# Patient Record
Sex: FEMALE | Race: WHITE | NOT HISPANIC OR LATINO | Employment: FULL TIME | URBAN - METROPOLITAN AREA
[De-identification: names, ages, dates, MRNs, and addresses within clinical notes are randomized per-mention and may not be internally consistent; named-entity substitution may affect disease eponyms.]

---

## 2018-11-08 ENCOUNTER — APPOINTMENT (OUTPATIENT)
Dept: ULTRASOUND IMAGING | Facility: CLINIC | Age: 63
End: 2018-11-08
Attending: EMERGENCY MEDICINE
Payer: COMMERCIAL

## 2018-11-08 ENCOUNTER — HOSPITAL ENCOUNTER (OUTPATIENT)
Facility: CLINIC | Age: 63
Setting detail: OBSERVATION
Discharge: HOME | End: 2018-11-08
Attending: EMERGENCY MEDICINE | Admitting: INTERNAL MEDICINE
Payer: COMMERCIAL

## 2018-11-08 VITALS
HEIGHT: 62 IN | TEMPERATURE: 98.1 F | SYSTOLIC BLOOD PRESSURE: 185 MMHG | BODY MASS INDEX: 41.97 KG/M2 | WEIGHT: 228.1 LBS | RESPIRATION RATE: 18 BRPM | DIASTOLIC BLOOD PRESSURE: 77 MMHG | OXYGEN SATURATION: 100 %

## 2018-11-08 DIAGNOSIS — K80.20 SYMPTOMATIC CHOLELITHIASIS: ICD-10-CM

## 2018-11-08 LAB
ALBUMIN SERPL-MCNC: 3.8 G/DL (ref 3.4–5)
ALP SERPL-CCNC: 88 U/L (ref 40–150)
ALT SERPL W P-5'-P-CCNC: 25 U/L (ref 0–50)
ANION GAP SERPL CALCULATED.3IONS-SCNC: 8 MMOL/L (ref 3–14)
AST SERPL W P-5'-P-CCNC: 10 U/L (ref 0–45)
BASOPHILS # BLD AUTO: 0.1 10E9/L (ref 0–0.2)
BASOPHILS NFR BLD AUTO: 0.5 %
BILIRUB SERPL-MCNC: 0.4 MG/DL (ref 0.2–1.3)
BUN SERPL-MCNC: 22 MG/DL (ref 7–30)
CALCIUM SERPL-MCNC: 8.7 MG/DL (ref 8.5–10.1)
CHLORIDE SERPL-SCNC: 105 MMOL/L (ref 94–109)
CO2 SERPL-SCNC: 26 MMOL/L (ref 20–32)
CREAT BLD-MCNC: 0.9 MG/DL (ref 0.52–1.04)
CREAT SERPL-MCNC: 0.89 MG/DL (ref 0.52–1.04)
DIFFERENTIAL METHOD BLD: ABNORMAL
EOSINOPHIL # BLD AUTO: 0.2 10E9/L (ref 0–0.7)
EOSINOPHIL NFR BLD AUTO: 2.2 %
ERYTHROCYTE [DISTWIDTH] IN BLOOD BY AUTOMATED COUNT: 12.8 % (ref 10–15)
GFR SERPL CREATININE-BSD FRML MDRD: 63 ML/MIN/1.7M2
GFR SERPL CREATININE-BSD FRML MDRD: 64 ML/MIN/1.7M2
GLUCOSE SERPL-MCNC: 221 MG/DL (ref 70–99)
HBA1C MFR BLD: 6.6 % (ref 0–5.6)
HCT VFR BLD AUTO: 41.3 % (ref 35–47)
HGB BLD-MCNC: 13.4 G/DL (ref 11.7–15.7)
IMM GRANULOCYTES # BLD: 0.1 10E9/L (ref 0–0.4)
IMM GRANULOCYTES NFR BLD: 0.5 %
LIPASE SERPL-CCNC: 234 U/L (ref 73–393)
LYMPHOCYTES # BLD AUTO: 2.7 10E9/L (ref 0.8–5.3)
LYMPHOCYTES NFR BLD AUTO: 24.4 %
MCH RBC QN AUTO: 29.3 PG (ref 26.5–33)
MCHC RBC AUTO-ENTMCNC: 32.4 G/DL (ref 31.5–36.5)
MCV RBC AUTO: 90 FL (ref 78–100)
MONOCYTES # BLD AUTO: 0.7 10E9/L (ref 0–1.3)
MONOCYTES NFR BLD AUTO: 6.6 %
NEUTROPHILS # BLD AUTO: 7.3 10E9/L (ref 1.6–8.3)
NEUTROPHILS NFR BLD AUTO: 65.8 %
NRBC # BLD AUTO: 0 10*3/UL
NRBC BLD AUTO-RTO: 0 /100
PLATELET # BLD AUTO: 211 10E9/L (ref 150–450)
POTASSIUM SERPL-SCNC: 3.6 MMOL/L (ref 3.4–5.3)
PROT SERPL-MCNC: 6.8 G/DL (ref 6.8–8.8)
RBC # BLD AUTO: 4.57 10E12/L (ref 3.8–5.2)
SODIUM SERPL-SCNC: 139 MMOL/L (ref 133–144)
WBC # BLD AUTO: 11.1 10E9/L (ref 4–11)

## 2018-11-08 PROCEDURE — G0378 HOSPITAL OBSERVATION PER HR: HCPCS

## 2018-11-08 PROCEDURE — 83036 HEMOGLOBIN GLYCOSYLATED A1C: CPT | Performed by: EMERGENCY MEDICINE

## 2018-11-08 PROCEDURE — 76705 ECHO EXAM OF ABDOMEN: CPT

## 2018-11-08 PROCEDURE — 99285 EMERGENCY DEPT VISIT HI MDM: CPT | Mod: 25

## 2018-11-08 PROCEDURE — 25000128 H RX IP 250 OP 636: Performed by: INTERNAL MEDICINE

## 2018-11-08 PROCEDURE — 96374 THER/PROPH/DIAG INJ IV PUSH: CPT

## 2018-11-08 PROCEDURE — 99207 ZZC CDG-CODE CATEGORY CHANGED: CPT | Performed by: INTERNAL MEDICINE

## 2018-11-08 PROCEDURE — 82565 ASSAY OF CREATININE: CPT

## 2018-11-08 PROCEDURE — 96375 TX/PRO/DX INJ NEW DRUG ADDON: CPT

## 2018-11-08 PROCEDURE — 99235 HOSP IP/OBS SAME DATE MOD 70: CPT | Performed by: INTERNAL MEDICINE

## 2018-11-08 PROCEDURE — 25000128 H RX IP 250 OP 636: Performed by: EMERGENCY MEDICINE

## 2018-11-08 PROCEDURE — 85025 COMPLETE CBC W/AUTO DIFF WBC: CPT | Performed by: EMERGENCY MEDICINE

## 2018-11-08 PROCEDURE — 83690 ASSAY OF LIPASE: CPT | Performed by: EMERGENCY MEDICINE

## 2018-11-08 PROCEDURE — 80053 COMPREHEN METABOLIC PANEL: CPT | Performed by: EMERGENCY MEDICINE

## 2018-11-08 PROCEDURE — 99207 ZZC APP CREDIT; MD BILLING SHARED VISIT: CPT | Performed by: INTERNAL MEDICINE

## 2018-11-08 PROCEDURE — 96361 HYDRATE IV INFUSION ADD-ON: CPT

## 2018-11-08 PROCEDURE — 99203 OFFICE O/P NEW LOW 30 MIN: CPT | Performed by: SURGERY

## 2018-11-08 RX ORDER — HYDROMORPHONE HYDROCHLORIDE 1 MG/ML
INJECTION, SOLUTION INTRAMUSCULAR; INTRAVENOUS; SUBCUTANEOUS
Status: DISCONTINUED
Start: 2018-11-08 | End: 2018-11-08 | Stop reason: HOSPADM

## 2018-11-08 RX ORDER — ACETAMINOPHEN 650 MG/1
650 SUPPOSITORY RECTAL EVERY 4 HOURS PRN
Status: DISCONTINUED | OUTPATIENT
Start: 2018-11-08 | End: 2018-11-08 | Stop reason: HOSPADM

## 2018-11-08 RX ORDER — ACETAMINOPHEN 325 MG/1
650 TABLET ORAL EVERY 4 HOURS PRN
Status: DISCONTINUED | OUTPATIENT
Start: 2018-11-08 | End: 2018-11-08 | Stop reason: HOSPADM

## 2018-11-08 RX ORDER — HYDROMORPHONE HYDROCHLORIDE 1 MG/ML
0.3 INJECTION, SOLUTION INTRAMUSCULAR; INTRAVENOUS; SUBCUTANEOUS
Status: DISCONTINUED | OUTPATIENT
Start: 2018-11-08 | End: 2018-11-08 | Stop reason: HOSPADM

## 2018-11-08 RX ORDER — KETOROLAC TROMETHAMINE 15 MG/ML
15 INJECTION, SOLUTION INTRAMUSCULAR; INTRAVENOUS ONCE
Status: COMPLETED | OUTPATIENT
Start: 2018-11-08 | End: 2018-11-08

## 2018-11-08 RX ORDER — ONDANSETRON 2 MG/ML
INJECTION INTRAMUSCULAR; INTRAVENOUS
Status: DISCONTINUED
Start: 2018-11-08 | End: 2018-11-08 | Stop reason: HOSPADM

## 2018-11-08 RX ORDER — ONDANSETRON 2 MG/ML
4 INJECTION INTRAMUSCULAR; INTRAVENOUS EVERY 6 HOURS PRN
Status: DISCONTINUED | OUTPATIENT
Start: 2018-11-08 | End: 2018-11-08 | Stop reason: HOSPADM

## 2018-11-08 RX ORDER — HYDROMORPHONE HYDROCHLORIDE 1 MG/ML
0.5 INJECTION, SOLUTION INTRAMUSCULAR; INTRAVENOUS; SUBCUTANEOUS
Status: DISCONTINUED | OUTPATIENT
Start: 2018-11-08 | End: 2018-11-08

## 2018-11-08 RX ORDER — ONDANSETRON 2 MG/ML
4 INJECTION INTRAMUSCULAR; INTRAVENOUS ONCE
Status: COMPLETED | OUTPATIENT
Start: 2018-11-08 | End: 2018-11-08

## 2018-11-08 RX ORDER — THYROID 30 MG/1
30 TABLET ORAL DAILY
Status: DISCONTINUED | OUTPATIENT
Start: 2018-11-08 | End: 2018-11-08 | Stop reason: CLARIF

## 2018-11-08 RX ORDER — ONDANSETRON 4 MG/1
4 TABLET, ORALLY DISINTEGRATING ORAL EVERY 6 HOURS PRN
Status: DISCONTINUED | OUTPATIENT
Start: 2018-11-08 | End: 2018-11-08 | Stop reason: HOSPADM

## 2018-11-08 RX ORDER — HYDROMORPHONE HYDROCHLORIDE 1 MG/ML
0.5 INJECTION, SOLUTION INTRAMUSCULAR; INTRAVENOUS; SUBCUTANEOUS ONCE
Status: DISCONTINUED | OUTPATIENT
Start: 2018-11-08 | End: 2018-11-08

## 2018-11-08 RX ORDER — NALOXONE HYDROCHLORIDE 0.4 MG/ML
.1-.4 INJECTION, SOLUTION INTRAMUSCULAR; INTRAVENOUS; SUBCUTANEOUS
Status: DISCONTINUED | OUTPATIENT
Start: 2018-11-08 | End: 2018-11-08 | Stop reason: HOSPADM

## 2018-11-08 RX ORDER — SODIUM CHLORIDE AND POTASSIUM CHLORIDE 150; 900 MG/100ML; MG/100ML
INJECTION, SOLUTION INTRAVENOUS CONTINUOUS
Status: DISCONTINUED | OUTPATIENT
Start: 2018-11-08 | End: 2018-11-08 | Stop reason: HOSPADM

## 2018-11-08 RX ADMIN — SODIUM CHLORIDE, POTASSIUM CHLORIDE, SODIUM LACTATE AND CALCIUM CHLORIDE 500 ML: 600; 310; 30; 20 INJECTION, SOLUTION INTRAVENOUS at 02:55

## 2018-11-08 RX ADMIN — HYDROMORPHONE HYDROCHLORIDE 0.5 MG: 1 INJECTION, SOLUTION INTRAMUSCULAR; INTRAVENOUS; SUBCUTANEOUS at 02:55

## 2018-11-08 RX ADMIN — ONDANSETRON 4 MG: 2 INJECTION INTRAMUSCULAR; INTRAVENOUS at 02:55

## 2018-11-08 RX ADMIN — POTASSIUM CHLORIDE AND SODIUM CHLORIDE: 900; 150 INJECTION, SOLUTION INTRAVENOUS at 06:26

## 2018-11-08 RX ADMIN — KETOROLAC TROMETHAMINE 15 MG: 15 INJECTION, SOLUTION INTRAMUSCULAR; INTRAVENOUS at 02:56

## 2018-11-08 ASSESSMENT — ENCOUNTER SYMPTOMS
ABDOMINAL PAIN: 1
VOMITING: 0
NAUSEA: 0
FEVER: 0

## 2018-11-08 NOTE — IP AVS SNAPSHOT
MRN:[id number]                      After Visit Summary   11/8/2018    Navya Haynes    MRN: [id number]           Thank you!     Thank you for choosing [facility] for your care. Our goal is always to provide you with excellent care. Hearing back from our patients is one way we can continue to improve our services. Please take a few minutes to complete the written survey that you may receive in the mail after you visit. If you would like to speak to someone directly about your visit please contact Patient Relations at [phone]. Thank you!          Patient Information     Date Of Birth          1955        About your hospital stay     You were admitted on:  November 8, 2018 You last received care in the:  [place] [address] [facility]    You were discharged on:  November 8, 2018        Reason for your hospital stay       Symptomatic cholelithiasis                  Who to Call     For medical emergencies, please call 911.  For non-urgent questions about your medical care, please call your primary care provider or clinic, [phone]          Attending Provider     Provider Specialty    Joey Izquierdo MD Emergency Medicine    [facility]       Primary Care Provider Office Phone # Fax #    Hansel Bryan [phone]      After Care Instructions     Activity       Your activity upon discharge: activity as tolerated            Diet       Follow this diet upon discharge: Low fat diet                  Follow-up Appointments     Follow-up and recommended labs and tests        Follow up with primary care provider, Hansel Bryan, within 7 days   Follow up with surgery as scheduled                  Pending Results     No orders found from 11/6/2018 to 11/9/2018.            Statement of Approval     Ordered          11/08/18 1605  I have reviewed and agree with all the recommendations and orders detailed  "in this document.  EFFECTIVE NOW     Approved and electronically signed by:  Asif Pineda MD             Admission Information     Date & Time Provider Department Dept. Phone    11/8/2018 Nichelle Crum MD [place] [address] [facility] [phone]      Your Vitals Were     Blood Pressure Temperature Respirations Height Weight Last Period    143/68 (BP Location: Right arm) 97  F (36.1  C) (Oral) 18 1.575 m (5' 2\") 103.5 kg (228 lb 1.6 oz) 10/01/2009    Pulse Oximetry BMI (Body Mass Index)                98% 41.72 kg/m2          MyChart Information     [employer] gives you secure access to your electronic health record. If you see a primary care provider, you can also send messages to your care team and make appointments. If you have questions, please call your primary care clinic.  If you do not have a primary care provider, please call [phone] and they will assist you.        Care EveryWhere ID     This is your Care EveryWhere ID. This could be used by other organizations to access your [facility] medical records  [id number]        Equal Access to Services     PRINCESS HARRISON : Hadii hannah Maria, wasophia rojas, qaakosua quispe, stephanie daniels. So [facility] [phone].    ATENCIÓN: Si habla español, tiene a tafoya disposición servicios gratuitos de asistencia lingüística. Llame al [phone].    We comply with applicable federal civil rights laws and [place] laws. We do not discriminate on the basis of race, color, national origin, age, disability, sex, sexual orientation, or gender identity.               Review of your medicines      Notice     You have not been prescribed any medications.             Protect others around you: Learn how to safely use, store and throw away your medicines at www.disposemymeds.org.             Medication List: This is a list of all your medications and when to take them. Check marks below indicate your daily home " schedule. Keep this list as a reference.      Notice     You have not been prescribed any medications.

## 2018-11-08 NOTE — PLAN OF CARE
"Problem: Patient Care Overview  Goal: Plan of Care/Patient Progress Review  PRIMARY DIAGNOSIS: \"GENERIC\" NURSING  OUTPATIENT/OBSERVATION GOALS TO BE MET BEFORE DISCHARGE:  1. ADLs back to baseline: Yes    2. Activity and level of assistance: Ambulating independently.    3. Pain status: Pain free.    4. Return to near baseline physical activity: Yes     Discharge Planner Nurse   Safe discharge environment identified: Yes  Barriers to discharge: Yes - tolerance to low fat diet       Entered by: Areli Selby 11/08/2018 3:29 PM     Please review provider order for any additional goals.   Nurse to notify provider when observation goals have been met and patient is ready for discharge.    Pt admitted for RUQ pain and nausea related to cholelithiasis  No interventions required throughout the course of the day  Denies pain and nausea - NPO with ice and meds  Advanced to low fat diet at 1430 - awaiting to see tolerance  RUQ tenderness with palpation, up independently in room   Surgery consulted        "

## 2018-11-08 NOTE — DISCHARGE SUMMARY
[facility]    Discharge Summary  Hospitalist    Date of Admission:  11/8/2018  Date of Discharge:  11/8/2018  Discharging Provider: Asif Pineda MD  Date of Service (when I saw the patient): 11/08/18    Discharge Diagnoses      1. Acute abdominal pain right upper quadrant sudden onset associated with diaphoresis.     2. Hypothyroidism     History of Present Illness   Navya Haynes is an 63 year old female who presented with RUQ pain.     Hospital Course   Navya Haynes is a 63 year old female who with past medical history significant for hypothyroidism presented to the emergency room with upper abdominal and right upper quadrant pain.  Right upper quadrant ultrasound showed mobile biliary stone no clear evidence of cholecystitis.  Patient is being admitted for pain control for Cholelithiasis.     Acute abdominal pain right upper quadrant sudden onset associated with diaphoresis.  --Gallstones noted on ultrasound with minimal gallbladder wall thickening. She had no fever no chills. Pain resolved with IV Dilaudid in the emergency room. she was initially kept N.p.o. she was evaluated by general surgery. Patient wanted to avoid surgery if possible. Surgery saw her and recommended outpatient follow up.      Hypothyroidism  --Continued on prior to admission thyroid replacement.she was advised to follow up with PCP.      Patient remained stable during hospital course. She was discharged home with a plan to follow up with surgery as outpatient.     Significant Results and Procedures   Results for orders placed or performed during the hospital encounter of 11/08/18   US Abdomen Limited    Narrative    ULTRASOUND ABDOMEN LIMITED RIGHT UPPER QUADRANT  11/8/2018 3:33 AM     HISTORY: Right upper quadrant pain.    COMPARISON: None.    FINDINGS: Mild diffuse increased hepatic echogenicity, likely  representing fatty infiltration. No hepatic masses. 1 cm nonmobile  gallstone in the neck of a borderline  distended gallbladder. The  gallbladder is otherwise unremarkable without gallstones, wall  thickening or pericholecystic fluid. No focal tenderness over the  gallbladder. No intra- or extrahepatic biliary dilatation. The common  duct is at the upper limits of normal in caliber, measuring 0.6 cm in  diameter. The right kidney has normal size and echogenicity, measuring  10.2 cm in length. No right intrarenal collecting system dilatation,  calculi or masses. No free fluid in the upper right hemiabdomen.      Impression    IMPRESSION:  1. 1 cm nonmobile gallstone in the neck of a borderline distended  gallbladder. The gallbladder is otherwise unremarkable in appearance.  If there is a clinical concern for acute cholecystitis, a HIDA scan  could be considered for further evaluation.  2. No biliary dilatation.  3. Mild diffuse fatty infiltration of the liver.    ADÁN CHAUDHRY MD         Pending Results   None  Code Status   Full Code       Primary Care Physician   Hansel Bryan        Discharge Disposition   None    Consultations This Hospital Stay   SURGERY GENERAL IP CONSULT    Time Spent on this Encounter   I, Asif Pineda MD, personally saw the patient today and spent greater than 30 minutes discharging this patient.    Discharge Orders     Reason for your hospital stay   Symptomatic cholelithiasis     Follow-up and recommended labs and tests    Follow up with primary care provider, Hansel Bryan, within 7 days   Follow up with surgery as scheduled     Activity   Your activity upon discharge: activity as tolerated     Diet   Follow this diet upon discharge: Low fat diet       Discharge Medications   There are no discharge medications for this patient.        Allergies   Allergies   Allergen Reactions     No Known Allergies      Data   Most Recent 3 CBC's:  Recent Labs   Lab Test  11/08/18   0254   WBC  11.1*   HGB  13.4   MCV  90   PLT  211      Most Recent 3 BMP's:  Recent Labs    Lab Test  11/08/18 0254   NA  139   POTASSIUM  3.6   CHLORIDE  105   CO2  26   BUN  22   CR  0.89   ANIONGAP  8   RAQUEL  8.7   GLC  221*     Most Recent 2 LFT's:  Recent Labs   Lab Test  11/08/18 0254   AST  10   ALT  25   ALKPHOS  88   BILITOTAL  0.4     Most Recent INR's and Anticoagulation Dosing History:  Anticoagulation Dose History     There is no flowsheet data to display.        Most Recent 3 Troponin's:No lab results found.  Most Recent Cholesterol Panel:No lab results found.  Most Recent 6 Bacteria Isolates From Any Culture (See EPIC Reports for Culture Details):No lab results found.  Most Recent TSH, T4 and A1c Labs:  Recent Labs   Lab Test  11/08/18 0254   A1C  6.6*

## 2018-11-08 NOTE — CONSULTS
Chief complaint:  Abdominal pain, right upper quadrant    HPI:  This patient is a 63 year old female who presents with right upper quadrant abdominal pain which began at about midnight last night.  The patient came into the emergency room at about 2 AM, and was given a dose of pain medication.  The patient states that her pain then resolved.  She denied fevers, but did have some subjective chills.  She denied nausea or vomiting.  The pain did radiate to her back.  She ate fried chicken for lunch and tied close for dinner prior to this attack.    Past Medical History:   has a past medical history of Contact dermatitis and other eczema, due to unspecified cause and Obesity, unspecified.    Past Surgical History:  Past Surgical History:   Procedure Laterality Date     NO HISTORY OF SURGERY          Social History:  Social History     Social History     Marital status:      Spouse name: N/A     Number of children: 3     Years of education: N/A     Occupational History     Not on file.     Social History Main Topics     Smoking status: Former Smoker     Smokeless tobacco: Never Used      Comment: quit 1977     Alcohol use 0.0 oz/week     1 Standard drinks or equivalent per week      Comment: rare     Drug use: No     Sexual activity: Yes     Partners: Male     Birth control/ protection: Surgical     Other Topics Concern     Not on file     Social History Narrative        Family History:  Family History   Problem Relation Age of Onset     Hypertension Mother      Hypertension Father      Hypertension Maternal Grandmother      Hypertension Paternal Grandmother      Diabetes Paternal Grandfather      Elderly     Lipids Mother      Lipids Father      Cardiovascular Mother      Pacer, possible other issues     Cancer Paternal Grandmother      Unknown primary     Cardiovascular Mother      Pacemaker      Cancer Mother      liver cancer, unknown primary       Review of Systems:  The 10 point Review of Systems is negative  other than noted in the HPI and above.    Physical Exam:  General - This is a well developed, well nourished female in no apparent distress.  HEENT - Normocephalic, atraumatic.  No scleral icterus.  Neck - supple without masses  Lungs - clear to ascultation.    Heart - Regular rate & rhythm without murmur  Abdomen:   soft, non-distended and nontender.  normal bowel sounds.  Extremities - warm without edema  Neurologic - nonfocal    Relevant labs:  Normal liver function tests.  Lipase 234.  White blood cell count 11,100.    Imaging:  Ultrasound reveals a 1 cm nonmobile gallstone in the neck of a borderline distended gallbladder.  No biliary dilatation.    Assessment and Plan:  This is a patient with a prominent gallstone and an attack of right upper quadrant pain.  Explained that this likely represents biliary colic, and that we would typically recommend surgery at some point.  The patient is interested in trying to avoid surgery if possible.  We did discuss laparoscopic cholecystectomy, along with its risks and complications, in detail.  I think it would be reasonable to start the patient on a low-fat diet.  She could then be discharged to home.  She is welcome to follow up with me in the office, or she may simply return if she has another attack.  I expanded I thought was very likely that she would indeed have more attacks in the future.  She expressed understanding.      Kiko Carvalho MD  [facility]

## 2018-11-08 NOTE — PROGRESS NOTES
PRIMARY DIAGNOSIS: ACUTE PAIN  OUTPATIENT/OBSERVATION GOALS TO BE MET BEFORE DISCHARGE:  1. Pain Status: Improved but still requiring IV narcotics.    2. Return to near baseline physical activity: No    3. Cleared for discharge by consultants (if involved): No    Discharge Planner Nurse   Safe discharge environment identified: Yes  Barriers to discharge: Yes       Entered by: Padmini Benitez 11/08/2018 7:07 AM   Pt BP elevated, anxious about hospitalization. Denies pain. Denies N/V. BS active x4, passing gas, LBM 11/8. NPO. Up independently. IVF @ 100. Consults: Surgery.    Please review provider order for any additional goals.   Nurse to notify provider when observation goals have been met and patient is ready for discharge.

## 2018-11-08 NOTE — ED NOTES
Pt given obs video.  Pt states pain is still ok, denies needs for pain medications at this time.   at bedside.

## 2018-11-08 NOTE — Clinical Note
Admitting Physician: JAROD TALLEY [[id number]]   [id number]   Bed Type: Observation Unit [54]   Special needs: Fall Risk [8]   Bed request comments: OBS ADMIT, 1 PERSON TF, BED REQUEST @ 7505

## 2018-11-08 NOTE — PROGRESS NOTES
Patient's After Visit Summary was reviewed with patient and .   Patient verbalized understanding of After Visit Summary, recommended follow up and was given an opportunity to ask questions.   Discharge medications sent home with patient/family: Not applicable.   Discharged with .  Offered wheeled ride to car, patient declined.  Patient discharged with belongings.

## 2018-11-08 NOTE — PHARMACY-ADMISSION MEDICATION HISTORY
Admission medication history interview status for this patient is complete. See [facility] admission navigator for allergy information, prior to admission medications and immunization status.     Medication history interview source(s):Patient  Medication history resources (including written lists, pill bottles, clinic record):None  Primary pharmacy: [facility] in [place]    Changes made to PTA medication list:  Added:  None  Deleted:  Co Q10, Iron, Kelp, MVI, Selenium & Harper Thyroid.  Changed:  None    Actions taken by pharmacist (provider contacted, etc):None     Additional medication history information: Pt does not take any meds or supplements currently, she stopped taking all few months ago.    Medication reconciliation/reorder completed by provider prior to medication history? Yes    Do you take OTC medications (eg tylenol, ibuprofen, fish oil, eye/ear drops, etc)?  No    For patients on insulin therapy:  No      Prior to Admission medications    Not on File

## 2018-11-08 NOTE — PLAN OF CARE
Problem: Patient Care Overview  Goal: Plan of Care/Patient Progress Review  Outcome: Improving  PRIMARY DIAGNOSIS: ACUTE RENAL COLIC    OUTPATIENT/OBSERVATION GOALS TO BE MET BEFORE DISCHARGE  1. Pain Status: Denies pain    2. Tolerating adequate PO diet: No, NPO    3. Surgical Intervention planned: Surgery consult ordered/pending    4. Cleared by consultants (if involved): No    5. Return to near baseline physical activity: Yes    Discharge Planner Nurse   Safe discharge environment identified:Yes  Barriers to discharge: No    A&O, VSS, denies pain/nausea.  Up independently. Positive bowel sounds/ BM this am prior to admission.  IV fluids infusing as ordered.  Awaiting surgical consult.  Will continue to monitor.          Please review provider order for any additional goals.   Nurse to notify provider when observation goals have been met and patient is ready for discharge.

## 2018-11-08 NOTE — PROGRESS NOTES
MD paged: , took first , on re-check it was elevated.  Is it still okay to discharge patient? Please call, thank you!    Addendum:  MD called to state patient was okay to discharge.  Patient and  educated on monitoring blood pressure at home, it was stated a blood pressure cuff was available at home to monitor going forward.  Otherwise VSS.

## 2018-11-08 NOTE — ED TRIAGE NOTES
Pt with right upper abdominal pain that woke her up approx midnight.  Denies N/V/D.  ABCs intact, alert and orientated.

## 2018-11-08 NOTE — IP AVS SNAPSHOT
[place] [address] [facility]    [address]    [facility] [address]    Phone:  [phone]    Fax:  [phone]                                       After Visit Summary   11/8/2018    Navya Haynes    MRN: [id number]           After Visit Summary Signature Page     I have received my discharge instructions, and my questions have been answered. I have discussed any challenges I see with this plan with the nurse or doctor.    ..........................................................................................................................................  Patient/Patient Representative Signature      ..........................................................................................................................................  Patient Representative Print Name and Relationship to Patient    ..................................................               ................................................  Date                                   Time    ..........................................................................................................................................  Reviewed by Signature/Title    ...................................................              ..............................................  Date                                               Time          22EPIC Rev 08/18

## 2018-11-08 NOTE — ED NOTES
[facility]  ED Nurse Handoff Report    Navya Haynes is a 63 year old female   ED Chief complaint: Abdominal Pain  . ED Diagnosis:   Final diagnoses:   Symptomatic cholelithiasis     Allergies:   Allergies   Allergen Reactions     No Known Allergies        Code Status: not on file  Activity level - Baseline/Home:  Independent. Activity Level - Current:   Stand with Assist. Lift room needed: No. Bariatric: No   Needed: No   Isolation: No. Infection: Not Applicable.     Vital Signs:   Vitals:    11/08/18 0330 11/08/18 0345 11/08/18 0400 11/08/18 0415   BP: 147/65 142/71 140/63 127/68   Resp:       Temp:       TempSrc:       SpO2: 98% 95% 92% 91%   Weight:           Cardiac Rhythm:  ,      Pain level: 0-10 Pain Scale: 1  Patient confused: No. Patient Falls Risk: Yes.   Elimination Status: has not yet voided   Patient Report - Initial Complaint: Abdominal Pain. Focused Assessment: Pt woke up approx midnight tonight with sudden onset right upper quadrant pain directly below ribs.  Denied any nausea, vomiting, or diarrhea.  Pain also radiated into lower back.  Pain subsided with 1 dose of dilaudid & toradol, pt sleepy after.   Tests Performed:   Labs Ordered and Resulted from Time of ED Arrival Up to the Time of Departure from the ED   CBC WITH PLATELETS DIFFERENTIAL - Abnormal; Notable for the following:        Result Value    WBC 11.1 (*)     All other components within normal limits   COMPREHENSIVE METABOLIC PANEL - Abnormal; Notable for the following:     Glucose 221 (*)     All other components within normal limits   LIPASE   CREATININE POCT   ISTAT CREATININE NURSING POCT     US Abdomen Limited   Final Result   IMPRESSION:   1. 1 cm nonmobile gallstone in the neck of a borderline distended   gallbladder. The gallbladder is otherwise unremarkable in appearance.   If there is a clinical concern for acute cholecystitis, a HIDA scan   could be considered for further evaluation.   2. No biliary  dilatation.   3. Mild diffuse fatty infiltration of the liver.      ADÁN CHAUDHRY MD        Treatments provided: See MAR  Family Comments:  at bedside  OBS brochure/video discussed/provided to patient:  Yes  ED Medications:   Medications   HYDROmorphone (PF) (DILAUDID) injection 0.5 mg (not administered)   HYDROmorphone (PF) (DILAUDID) injection 0.5 mg (0.5 mg Intravenous Given 11/8/18 0255)   ondansetron (ZOFRAN) 2 MG/ML injection (not administered)   ketorolac (TORADOL) injection 15 mg (15 mg Intravenous Given 11/8/18 0256)   ondansetron (ZOFRAN) injection 4 mg (4 mg Intravenous Given 11/8/18 0255)   lactated ringers BOLUS 500 mL (0 mLs Intravenous Stopped 11/8/18 0331)     Drips infusing:  No  For the majority of the shift, the patient's behavior Green. Interventions performed were rounding.     Severe Sepsis OR Septic Shock Diagnosis Present: No      ED Nurse Name/Phone Number: Neris Shin,   4:32 AM  RECEIVING UNIT ED HANDOFF REVIEW    Above ED Nurse Handoff Report was reviewed: Yes  Reviewed by: Padmini Benitez on November 8, 2018 at 5:51 AM

## 2018-11-08 NOTE — ED PROVIDER NOTES
History     Chief Complaint:  Abdominal Pain    HPI   Navya Haynes is a 63 year old female who presents with right upper quadrant abdominal pain. The patient presents with her  and reports that the family ate dinner at around 4718-4187 tonight. Everyone had about the same meal. The patient later went to bed but woke up with this sharp acute right flank / right upper quadrant pain which radiates into her mid back as well. She denies any pain radiating down into groin.  No nausea vomiting diarrhea, No fever. No abdominal surgery.     Allergies:  No known drug allergies.     Medications:    None    Past Medical History:    Contact dermatitis  Obesity  Hypothyroidism       Past Surgical History:    History reviewed. No pertinent past surgical history.     Family History:    hypertension  Lipids   Cardiovascular   Cancer    Social History:  The patient  reports that she has quit smoking. She has never used smokeless tobacco. She reports that she drinks alcohol. She reports that she does not use illicit drugs.   Marital Status:   [2]     Review of Systems   Constitutional: Negative for fever.   Gastrointestinal: Positive for abdominal pain. Negative for nausea and vomiting.   All other systems reviewed and are negative.    Physical Exam   First Vitals:  BP: 167/67  Heart Rate: 68  Temp: 97.8  F (36.6  C)  Resp: 18  Weight: 102.1 kg (225 lb)  SpO2: 100 %      Physical Exam   Constitutional: She appears distressed (Uncomfortable appearing secondary to pain).   HENT:   Right Ear: External ear normal.   Left Ear: External ear normal.   Nose: Nose normal.   Eyes: Conjunctivae and lids are normal.   Neck: Neck supple. No tracheal deviation present.   Cardiovascular: Regular rhythm and intact distal pulses.    Pulmonary/Chest: Breath sounds normal. No respiratory distress.   Abdominal: Soft. She exhibits no distension. There is tenderness (Tenderness in the epigastrium and right upper quadrant negative Contreras  sign no tenderness at McBurney's point). There is no rebound and no guarding.   Musculoskeletal:   No peripheral edema   Neurological:   MAEE, no gross focal motor or sensory deficit   Skin: Skin is warm and dry. She is not diaphoretic.   Psychiatric: She has a normal mood and affect.   Nursing note and vitals reviewed.      Emergency Department Course   Imaging:   US Abdomen Limited   Final Result   IMPRESSION:   1. 1 cm nonmobile gallstone in the neck of a borderline distended   gallbladder. The gallbladder is otherwise unremarkable in appearance.   If there is a clinical concern for acute cholecystitis, a HIDA scan   could be considered for further evaluation.   2. No biliary dilatation.   3. Mild diffuse fatty infiltration of the liver.      ADÁN CHAUDHRY MD          I communicated the results of the imaging studies with the patient who expressed understanding of these findings.      Laboratory:  Creatinine POCT: WNL  CBC: WBC 11.1, otherwise within normal limits   CMP: Glucose 221, otherwise within normal limits   Lipase 234    Interventions:  0255: Dilaudid 0.5mg  0255: Lactated ringers bolus 500 mL given  0255: Toradol 15mg  0255: Zofran 4mg     Emergency Department Course:  Past medical records, nursing notes, and vitals reviewed.   I performed an exam of the patient and obtained history, as documented above.       IV inserted and blood drawn for the above work up to be conducted.      Discussed the patient with Dr. Marx, who will admit the patient to an observation bed for further monitoring, evaluation, and treatment.      Findings and plan explained to the Patient who consents to admission.   Impression & Plan    Medical Decision Making:  RUQ abdominal pain which is concerning for cholecystitis/cholelithiasis/biliary colic v choledocholithiasis v SBO v enterocolitis v PUD v acute hepatitis v cholangitis v ureterolithasis v pyelonephritis. I think it is less likely aaa v aortic dissection v acute  mesenteric ischemia v atypical appendicitis, or primary cardiopulmonary disorder.    Pt improved with above interventions, workup showing likely symptomatic cholelithiasis.  Very mild leukocytosis no secondary findings on ultrasound of cholecystitis.  On repeat examination she is much more comfortable but still has pain in the right upper quadrant with inspiration.  No evidence of choledocholithiasis by blood tests or ultrasound.  Recommended observation admission surgical consultation.      Diagnosis:    ICD-10-CM    1. Symptomatic cholelithiasis K80.20        Disposition:  Admitted to observation   Gino TOPETE, am serving as a scribe on 11/8/2018 to document services personally performed by Joey Izquierdo MD* based on my observations and the provider's statements to me.    [facility] [place] EMERGENCY DEPARTMENT       Joey Izquierdo MD  11/08/18 0653

## 2018-11-08 NOTE — H&P
[facility]    Hospitalist History and Physical    Name: Navya Haynes    MRN: [id number]  YOB: 1955    Age: 63 year old  Date of Admission:  11/8/2018  Date of Service (when I saw the patient): 11/08/18    Assessment & Plan   Navya Haynes is a 63 year old female who with past medical history significant for hypothyroidism presented to the emergency room with upper abdominal and right upper quadrant pain.  Right upper quadrant ultrasound showed mobile biliary stone no clear evidence of cholecystitis.  Patient is being admitted for pain control for Cholelithiasis.    Acute abdominal pain right upper quadrant sudden onset associated with diaphoresis.  --Gallstones noted on ultrasound with minimal gallbladder wall thickening  --No fever no chills.  --Pain resolved with IV Dilaudid in the emergency room  --Admit for pain control PRN IV Dilaudid  --N.p.o. for now until patient is evaluated by general surgery.  Patient would like to avoid surgery if possible.  We will continue n.p.o. until evaluated by general surgery  --PRN pain meds  --IV fluids  --Admit for observation    Hypothyroidism  --Continue prior to admission thyroid replacement.  --We will need to adjust dose and do med recon once available    DVT Prophylaxis: Anti-embolisim stockings (TEDs)  Code Status: Full Code    Disposition: Expected discharge in 1-2 days once pain is controlled and evaluated by surgery    Primary Care Physician   Hansel Bryan    Chief Complaint   Upper abdominal pain started at midnight    History is obtained from the patient    History of Present Illness   Navya Haynes is a 63 year old female with past medical history significant for hypothyroidism comes to the emergency room with sudden onset severe abdominal pain.  She woke up at midnight severe sudden onset pain.  Pain described as sharp cramp-like 10 out of 10 in intensity mainly in upper abdomen then localizing in right upper quadrant.   Radiating to her back.  No nausea no vomiting no fever.  She felt cold had chills and was diaphoretic.  Patient has never had similar pain in past.  Did not eat since the pain started.  No relieving factors no exacerbating factors.  Patient could not find any position to make her comfortable.  She came to the emergency room.  She does give history of eating a greasy fried chicken for lunch yesterday and tacos at night.  Evaluation in the emergency room showed gallstones.  No evidence of Aimee cystitis.  Patient is being admitted for further evaluation and pain management.  Review of all the other systems negative    Past Medical History    Past Medical History:   Diagnosis Date     Contact dermatitis and other eczema, due to unspecified cause      Obesity, unspecified          Past Surgical History   Past Surgical History:   Procedure Laterality Date     NO HISTORY OF SURGERY         Prior to Admission Medications   Prior to Admission Medications   Prescriptions Last Dose Informant Patient Reported? Taking?   ARMOUR THYROID 30 MG PO TABS Unknown at Unknown time  No No   Si TABLET TWICE DAILY (follow up thyroid labs due)   COQ10 50 MG PO CAPS   Yes No   Si CAPSULE DAILY   IRON 28 MG OR TABS   Yes No   Si CAPSULE DAILY   KELP OR TABS   Yes No   Si TABLET DAILY   ONE-A-DAY ESSENTIAL OR TABS   Yes No   Si TABLET DAILY  ENZYMATIC THERAPY (BP manager)   SELENIUM 200 MCG OR CAPS   Yes No   Si CAPSULE DAILY      Facility-Administered Medications: None     Allergies   Allergies   Allergen Reactions     No Known Allergies        Social History   Social History   Substance Use Topics     Smoking status: Former Smoker     Smokeless tobacco: Never Used      Comment: quit      Alcohol use 0.0 oz/week     1 Standard drinks or equivalent per week      Comment: rare     Social History     Social History Narrative     Lives with family.  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  Denies use of smoking.   Social drinking rarely    Family History   Mother had liver liver cancer.  Not quite sure if it was primary or secondary  Grandparents had heart disease  Father  of alcohol-related pancreatitis    Review of Systems   A Comprehensive greater than 10 system review of systems was carried out.  Pertinent positives and negatives are noted above.  Otherwise negative for contributory information.    Physical Exam   Temp: 97.8  F (36.6  C) Temp src: Oral BP: 137/64   Heart Rate: 68 Resp: 18 SpO2: 93 % O2 Device: None (Room air)    Vital Signs with Ranges  Temp:  [97.8  F (36.6  C)] 97.8  F (36.6  C)  Heart Rate:  [68] 68  Resp:  [18] 18  BP: (127-171)/(63-79) 137/64  SpO2:  [91 %-100 %] 93 %  225 lbs 0 oz    GEN:  Alert, oriented x 3, appears comfortable, no overt distress  HEENT:  Normocephalic/atraumatic, no scleral icterus, no nasal discharge, mouth dry  CV:  Regular rate and rhythm, no murmur or JVD.  S1 + S2 noted, no S3 or S4.  LUNGS:  Clear to auscultation bilaterally without rales/rhonchi/wheezing/retractions.  Symmetric chest rise on inhalation noted.  ABD:  Active bowel sounds, soft, minimal upper abdominal epigastric discomfort. no rebound/guarding/rigidity.  EXT:  No edema.  No cyanosis.  No joint synovitis noted.  SKIN:  Dry to touch, no exanthems noted in the visualized areas.  NEURO:  Symmetric muscle strength,   No new focal deficits appreciated.    Data   Data reviewed today:  I personally reviewed the Right upper quadrant ultrasound image(s) showing Gallstones.      Recent Labs  Lab 18   WBC 11.1*   HGB 13.4   HCT 41.3   MCV 90          Recent Labs  Lab 18   NA  --  139   POTASSIUM  --  3.6   CHLORIDE  --  105   CO2  --  26   ANIONGAP  --  8   GLC  --  221*   BUN  --  22   CR  --  0.89   GFRESTIMATED 63 64   GFRESTBLACK 77 78   RAQUEL  --  8.7     No results for input(s): CULT in the last 168 hours.    Recent Labs  Lab 18   AST 10   ALT 25    ALKPHOS 88   BILITOTAL 0.4     No results for input(s): LACT in the last 168 hours.    Recent Labs  Lab 11/08/18  0254   LIPASE 234     No results for input(s): COLOR, APPEARANCE, URINEGLC, URINEBILI, URINEKETONE, SG, UBLD, URINEPH, PROTEIN, UROBILINOGEN, NITRITE, LEUKEST, RBCU, WBCU in the last 168 hours.    Recent Results (from the past 24 hour(s))   US Abdomen Limited    Narrative    ULTRASOUND ABDOMEN LIMITED RIGHT UPPER QUADRANT  11/8/2018 3:33 AM     HISTORY: Right upper quadrant pain.    COMPARISON: None.    FINDINGS: Mild diffuse increased hepatic echogenicity, likely  representing fatty infiltration. No hepatic masses. 1 cm nonmobile  gallstone in the neck of a borderline distended gallbladder. The  gallbladder is otherwise unremarkable without gallstones, wall  thickening or pericholecystic fluid. No focal tenderness over the  gallbladder. No intra- or extrahepatic biliary dilatation. The common  duct is at the upper limits of normal in caliber, measuring 0.6 cm in  diameter. The right kidney has normal size and echogenicity, measuring  10.2 cm in length. No right intrarenal collecting system dilatation,  calculi or masses. No free fluid in the upper right hemiabdomen.      Impression    IMPRESSION:  1. 1 cm nonmobile gallstone in the neck of a borderline distended  gallbladder. The gallbladder is otherwise unremarkable in appearance.  If there is a clinical concern for acute cholecystitis, a HIDA scan  could be considered for further evaluation.  2. No biliary dilatation.  3. Mild diffuse fatty infiltration of the liver.    ADÁN CHAUDHRY MD

## 2019-09-27 ENCOUNTER — HEALTH MAINTENANCE LETTER (OUTPATIENT)
Age: 64
End: 2019-09-27

## 2020-06-23 ENCOUNTER — OFFICE VISIT (OUTPATIENT)
Dept: FAMILY MEDICINE | Facility: CLINIC | Age: 65
End: 2020-06-23

## 2020-06-23 VITALS
SYSTOLIC BLOOD PRESSURE: 152 MMHG | BODY MASS INDEX: 40.04 KG/M2 | WEIGHT: 217.6 LBS | TEMPERATURE: 98.7 F | RESPIRATION RATE: 18 BRPM | OXYGEN SATURATION: 98 % | HEART RATE: 74 BPM | DIASTOLIC BLOOD PRESSURE: 88 MMHG | HEIGHT: 62 IN

## 2020-06-23 DIAGNOSIS — E66.01 MORBID OBESITY (H): ICD-10-CM

## 2020-06-23 DIAGNOSIS — Z86.39 H/O THYROID DISEASE: ICD-10-CM

## 2020-06-23 DIAGNOSIS — Z76.89 HEALTH CARE HOME: ICD-10-CM

## 2020-06-23 DIAGNOSIS — Z71.89 ACP (ADVANCE CARE PLANNING): ICD-10-CM

## 2020-06-23 DIAGNOSIS — R19.7 DIARRHEA, UNSPECIFIED TYPE: Primary | ICD-10-CM

## 2020-06-23 LAB
% GRANULOCYTES: 67.6 %
HCT VFR BLD AUTO: 43.6 % (ref 35–47)
HEMOGLOBIN: 14.4 G/DL (ref 11.7–15.7)
LYMPHOCYTES NFR BLD AUTO: 23.4 %
MCH RBC QN AUTO: 30 PG (ref 26–33)
MCHC RBC AUTO-ENTMCNC: 33 G/DL (ref 31–36)
MCV RBC AUTO: 90.9 FL (ref 78–100)
MONOCYTES NFR BLD AUTO: 9 %
PLATELET COUNT - QUEST: 185 10^9/L (ref 150–375)
RBC # BLD AUTO: 4.8 10*12/L (ref 3.8–5.2)
WBC # BLD AUTO: 9.5 10*9/L (ref 4–11)

## 2020-06-23 PROCEDURE — 84439 ASSAY OF FREE THYROXINE: CPT | Mod: 90 | Performed by: FAMILY MEDICINE

## 2020-06-23 PROCEDURE — 36415 COLL VENOUS BLD VENIPUNCTURE: CPT | Performed by: FAMILY MEDICINE

## 2020-06-23 PROCEDURE — 85025 COMPLETE CBC W/AUTO DIFF WBC: CPT | Performed by: FAMILY MEDICINE

## 2020-06-23 PROCEDURE — 84443 ASSAY THYROID STIM HORMONE: CPT | Mod: 90 | Performed by: FAMILY MEDICINE

## 2020-06-23 PROCEDURE — 99203 OFFICE O/P NEW LOW 30 MIN: CPT | Performed by: FAMILY MEDICINE

## 2020-06-23 PROCEDURE — 80053 COMPREHEN METABOLIC PANEL: CPT | Performed by: FAMILY MEDICINE

## 2020-06-23 SDOH — ECONOMIC STABILITY: FOOD INSECURITY: WITHIN THE PAST 12 MONTHS, YOU WORRIED THAT YOUR FOOD WOULD RUN OUT BEFORE YOU GOT MONEY TO BUY MORE.: NEVER TRUE

## 2020-06-23 SDOH — ECONOMIC STABILITY: TRANSPORTATION INSECURITY
IN THE PAST 12 MONTHS, HAS THE LACK OF TRANSPORTATION KEPT YOU FROM MEDICAL APPOINTMENTS OR FROM GETTING MEDICATIONS?: NO

## 2020-06-23 SDOH — ECONOMIC STABILITY: TRANSPORTATION INSECURITY
IN THE PAST 12 MONTHS, HAS LACK OF TRANSPORTATION KEPT YOU FROM MEETINGS, WORK, OR FROM GETTING THINGS NEEDED FOR DAILY LIVING?: NO

## 2020-06-23 SDOH — HEALTH STABILITY: MENTAL HEALTH: HOW OFTEN DO YOU HAVE 6 OR MORE DRINKS ON ONE OCCASION?: NEVER

## 2020-06-23 SDOH — ECONOMIC STABILITY: FOOD INSECURITY: WITHIN THE PAST 12 MONTHS, THE FOOD YOU BOUGHT JUST DIDN'T LAST AND YOU DIDN'T HAVE MONEY TO GET MORE.: NEVER TRUE

## 2020-06-23 SDOH — HEALTH STABILITY: MENTAL HEALTH: HOW OFTEN DO YOU HAVE A DRINK CONTAINING ALCOHOL?: MONTHLY OR LESS

## 2020-06-23 SDOH — HEALTH STABILITY: MENTAL HEALTH: HOW MANY STANDARD DRINKS CONTAINING ALCOHOL DO YOU HAVE ON A TYPICAL DAY?: 1 OR 2

## 2020-06-23 SDOH — ECONOMIC STABILITY: INCOME INSECURITY: HOW HARD IS IT FOR YOU TO PAY FOR THE VERY BASICS LIKE FOOD, HOUSING, MEDICAL CARE, AND HEATING?: NOT HARD AT ALL

## 2020-06-23 ASSESSMENT — MIFFLIN-ST. JEOR: SCORE: 1490.28

## 2020-06-23 NOTE — NURSING NOTE
Navya is here today to discuss watery diarrhea she has had for a few weeks.    Pre-visit Screening:  Immunizations: not up to date  Colonoscopy:  is due and to be scheduled by patient for later completion  Mammogram: is due and to be scheduled by patient for later completion  Asthma Action Test/Plan:  NA  PHQ9:  NA  GAD7:  MICHELL  Questioned patient about current smoking habits Pt. quit smoking some time ago.  Ok to leave detailed message on voice mail for today's visit only Yes, phone # [phone]

## 2020-06-23 NOTE — PROGRESS NOTES
SUBJECTIVE: 64 year old female complaining of watery diarrhea acute onset on 6/10/2020 with stomach flu symptoms. Chills noted as well. All symptoms better without cramping but now yellow watery stools with right upper quadrant pressure. Multiple BM usually for 6 day(s).   The patient describes lost 12 pounds unintentional. 2 years ago gallstone noted after RUQ pain/ that was very different. Tried Imodium on vacation/ not helpful. She is hungry and wants to eat. Feels well but worried about stools.  The patient denies a history of melena or mucous. No abdominal pain today. No fever.  Smoking history: No.   Relevant past medical history: positive for son had similar illness but resolved easily. H/O hypothyroid now off medication.    No current outpatient medications on file.     No current facility-administered medications for this visit.      Past Surgical History:   Procedure Laterality Date     HC TOOTH EXTRACTION W/FORCEP  age 30's     Family History   Problem Relation Age of Onset     Hypertension Father      Lipids Father      Hypertension Mother      Lipids Mother      Cardiovascular Mother         Pacer, possible other issues/Pacemaker      Cancer Mother         liver cancer, unknown primary     Hypertension Maternal Grandmother      Hypertension Paternal Grandmother      Cancer Paternal Grandmother         Unknown primary     Diabetes Paternal Grandfather         Elderly      ROS: 10 point ROS neg other than the symptoms noted above in the HPI.      OBJECTIVE: The patient appears healthy, alert, no distress, cooperative, smiling and over weight.   EARS: negative  NOSE/SINUS: Nares normal. Septum midline. Mucosa normal. No drainage or sinus tenderness.   THROAT: normal   NECK:Neck supple. No adenopathy. Thyroid symmetric, normal size,, Carotids without bruits.   CHEST: Regular rate and  rhythm. S1 and S2 normal, no murmurs, clicks, gallops or rubs. No edema or JVD. Chest is clear; no wheezes or rales.  ABD:  The abdomen is soft without tenderness, guarding, mass or organomegaly. Bowel sounds are normal. No CVA tenderness or inguinal adenopathy noted.  Skin: Skin color, temperature, and turgor are normal. No rashes or suspicious skin lesions noted.'BMI : Body mass index is 39.8 kg/m .    CBC: WNL  See update [facility] in Epic    ASSESSMENT: (R19.7) Diarrhea, unspecified type  (primary encounter diagnosis)  Comment: read and follow handout  Plan: STOOL CX O&P INFORMATION ([facility]), CL AFF         HEMOGRAM/PLATE/DIFF ([facility]), Comprehensive         Metobolic Panel ([facility]), TSH with free T4 reflex         ([facility]), VENOUS COLLECTION        Await labs.     (Z86.39) H/O thyroid disease  Comment: I have reviewed the patient's medical history in detail and updated the computerized patient record.    Plan: TSH with free T4 reflex ([facility]), VENOUS         COLLECTION        Await labs    (E66.01) Morbid obesity (H)  Plan: I have reviewed the patient's medical history in detail and updated the computerized patient record.      (Z71.89) ACP (advance care planning)  Plan: I have reviewed the patient's medical history in detail and updated the computerized patient record.      (Z76.89) Health Care Home  Plan: I have reviewed the patient's medical history in detail and updated the computerized patient record.

## 2020-06-24 DIAGNOSIS — R19.7 DIARRHEA: Primary | ICD-10-CM

## 2020-06-24 LAB
ALBUMIN SERPL-MCNC: 4.4 G/DL (ref 3.6–5.1)
ALBUMIN/GLOB SERPL: 2.1 {RATIO} (ref 1–2.5)
ALP SERPL-CCNC: 68 U/L (ref 33–130)
ALT 1742-6: 19 U/L (ref 0–32)
AST 1920-8: 17 U/L (ref 0–35)
BILIRUB SERPL-MCNC: 0.9 MG/DL (ref 0.2–1.2)
BUN SERPL-MCNC: 14 MG/DL (ref 7–25)
BUN/CREATININE RATIO: 15.6 (ref 6–22)
CALCIUM SERPL-MCNC: 9.3 MG/DL (ref 8.6–10.3)
CHLORIDE SERPLBLD-SCNC: 106.6 MMOL/L (ref 98–110)
CO2 SERPL-SCNC: 23.1 MMOL/L (ref 20–32)
CREAT SERPL-MCNC: 0.9 MG/DL (ref 0.7–1.18)
GLOBULIN, CALCULATED - QUEST: 2.1 (ref 1.9–3.7)
GLUCOSE SERPL-MCNC: 103 MG/DL (ref 60–99)
POTASSIUM SERPL-SCNC: 4.63 MMOL/L (ref 3.5–5.3)
PROT SERPL-MCNC: 6.5 G/DL (ref 6.1–8.1)
SODIUM SERPL-SCNC: 141.8 MMOL/L (ref 135–146)
T4, FREE, NON-DIALYSIS - QUEST: 0.9 NG/DL (ref 0.8–1.8)
TSH SERPL-ACNC: 6.25 MIU/L (ref 0.4–4.5)

## 2020-06-24 PROCEDURE — 87427 SHIGA-LIKE TOXIN AG IA: CPT | Mod: 90 | Performed by: FAMILY MEDICINE

## 2020-06-24 PROCEDURE — 87177 OVA AND PARASITES SMEARS: CPT | Mod: 90 | Performed by: FAMILY MEDICINE

## 2020-06-24 PROCEDURE — 87209 SMEAR COMPLEX STAIN: CPT | Mod: 90 | Performed by: FAMILY MEDICINE

## 2020-06-24 PROCEDURE — 87045 FECES CULTURE AEROBIC BACT: CPT | Mod: 90 | Performed by: FAMILY MEDICINE

## 2020-06-24 PROCEDURE — 87046 STOOL CULTR AEROBIC BACT EA: CPT | Mod: 90 | Performed by: FAMILY MEDICINE

## 2020-06-29 ENCOUNTER — MYC MEDICAL ADVICE (OUTPATIENT)
Dept: FAMILY MEDICINE | Facility: CLINIC | Age: 65
End: 2020-06-29

## 2020-06-29 DIAGNOSIS — R19.7 DIARRHEA: Primary | ICD-10-CM

## 2020-06-29 DIAGNOSIS — K52.9 GASTROENTERITIS: ICD-10-CM

## 2020-06-29 LAB
CULTURE - QUEST: NORMAL
CULTURE, STOOL - QUEST: NORMAL
EIA - QUEST: NORMAL
TRICHROME - QUEST: NORMAL

## 2020-06-30 NOTE — TELEPHONE ENCOUNTER
MY CHART message sent to patient with info for [facility]. On line referral has been issued.

## 2020-06-30 NOTE — TELEPHONE ENCOUNTER
Dr. Velázquez please sign the pending Gastro order for Consult. Once signed I will issue referral to [facility].    Thank you

## 2020-07-13 ENCOUNTER — TRANSFERRED RECORDS (OUTPATIENT)
Dept: FAMILY MEDICINE | Facility: CLINIC | Age: 65
End: 2020-07-13

## 2020-07-22 ENCOUNTER — TRANSFERRED RECORDS (OUTPATIENT)
Dept: FAMILY MEDICINE | Facility: CLINIC | Age: 65
End: 2020-07-22

## 2021-01-09 ENCOUNTER — HEALTH MAINTENANCE LETTER (OUTPATIENT)
Age: 66
End: 2021-01-09

## 2021-04-12 ENCOUNTER — OFFICE VISIT (OUTPATIENT)
Dept: SURGERY | Facility: CLINIC | Age: 66
End: 2021-04-12
Payer: COMMERCIAL

## 2021-04-12 VITALS
OXYGEN SATURATION: 99 % | DIASTOLIC BLOOD PRESSURE: 78 MMHG | BODY MASS INDEX: 39.93 KG/M2 | HEART RATE: 78 BPM | WEIGHT: 217 LBS | RESPIRATION RATE: 16 BRPM | HEIGHT: 62 IN | SYSTOLIC BLOOD PRESSURE: 138 MMHG

## 2021-04-12 DIAGNOSIS — M62.08 DIASTASIS RECTI: Primary | ICD-10-CM

## 2021-04-12 PROCEDURE — 99214 OFFICE O/P EST MOD 30 MIN: CPT | Performed by: SURGERY

## 2021-04-12 RX ORDER — THYROID 90 MG/1
90 TABLET ORAL
COMMUNITY

## 2021-04-12 ASSESSMENT — MIFFLIN-ST. JEOR: SCORE: 1482.56

## 2021-04-12 NOTE — LETTER
2021       Re: Navya Haynes - 1955    I was asked by Dr. Tapia to evaluate this patient regarding central abdominal bulging.  The patient has been undergoing some physical therapy and was noted to have a fairly prominent central abdominal bulge.  I have been asked to evaluate for possible hernia.     Physical exam:  The patient is in no apparent distress.  Breathing is not labored.  The abdomen is obese with central prominence.  Examination reveals a significant diastasis, but no obvious hernia.  The overlying skin is unremarkable.     Assessment and plan: This is a patient with central bulging but no obvious hernia.  I explained to her the difference between a hernia and a diastasis.  I would not recommend any surgical intervention.  I did explain that she should probably try to avoid sit ups and crunches as much as possible.  If she is does develop a discrete bulge within the diastases, I would be happy to reassess this.          Kiko Carvalho MD  [facility]     Please route or send letter to:  Dr. Yin Tapia [facility]

## 2021-04-12 NOTE — PROGRESS NOTES
I was asked by Dr. Tapia to evaluate this patient regarding central abdominal bulging.  The patient has been undergoing some physical therapy and was noted to have a fairly prominent central abdominal bulge.  I have been asked to evaluate for possible hernia.    Past Medical History:   Diagnosis Date     Contact dermatitis and other eczema, due to unspecified cause      Obesity, unspecified    Gallstones    Past Surgical History:   Procedure Laterality Date     HC TOOTH EXTRACTION W/FORCEP  age 30's     Physical exam:  The patient is in no apparent distress.  Breathing is not labored.  The abdomen is obese with central prominence.  Examination reveals a significant diastasis, but no obvious hernia.  The overlying skin is unremarkable.    Assessment and plan: This is a patient with central bulging but no obvious hernia.  I explained to her the difference between a hernia and a diastasis.  I would not recommend any surgical intervention.  I did explain that she should probably try to avoid sit ups and crunches as much as possible.  If she is does develop a discrete bulge within the diastases, I would be happy to reassess this.    A total of 30 minutes was spent today in chart review, patient examination and discussion and documentation.    Kiko Carvalho MD  [facility]    Please route or send letter to:  Dr. Yin Tapia - [facility]

## 2021-05-08 ENCOUNTER — HEALTH MAINTENANCE LETTER (OUTPATIENT)
Age: 66
End: 2021-05-08

## 2021-08-28 ENCOUNTER — HEALTH MAINTENANCE LETTER (OUTPATIENT)
Age: 66
End: 2021-08-28

## 2021-10-23 ENCOUNTER — HEALTH MAINTENANCE LETTER (OUTPATIENT)
Age: 66
End: 2021-10-23

## 2021-12-18 ENCOUNTER — HEALTH MAINTENANCE LETTER (OUTPATIENT)
Age: 66
End: 2021-12-18

## 2022-02-12 ENCOUNTER — HEALTH MAINTENANCE LETTER (OUTPATIENT)
Age: 67
End: 2022-02-12

## 2022-04-09 ENCOUNTER — HEALTH MAINTENANCE LETTER (OUTPATIENT)
Age: 67
End: 2022-04-09

## 2022-07-30 ENCOUNTER — HEALTH MAINTENANCE LETTER (OUTPATIENT)
Age: 67
End: 2022-07-30

## 2022-09-06 ENCOUNTER — APPOINTMENT (OUTPATIENT)
Dept: GENERAL RADIOLOGY | Facility: CLINIC | Age: 67
End: 2022-09-06
Attending: PHYSICIAN ASSISTANT
Payer: COMMERCIAL

## 2022-09-06 ENCOUNTER — HOSPITAL ENCOUNTER (EMERGENCY)
Facility: CLINIC | Age: 67
Discharge: HOME | End: 2022-09-06
Attending: PHYSICIAN ASSISTANT | Admitting: PHYSICIAN ASSISTANT
Payer: COMMERCIAL

## 2022-09-06 VITALS
SYSTOLIC BLOOD PRESSURE: 185 MMHG | TEMPERATURE: 99.1 F | WEIGHT: 223 LBS | HEART RATE: 97 BPM | RESPIRATION RATE: 20 BRPM | OXYGEN SATURATION: 97 % | BODY MASS INDEX: 40.79 KG/M2 | DIASTOLIC BLOOD PRESSURE: 102 MMHG

## 2022-09-06 DIAGNOSIS — S62.102A CLOSED FRACTURE OF LEFT WRIST, INITIAL ENCOUNTER: ICD-10-CM

## 2022-09-06 PROCEDURE — 73110 X-RAY EXAM OF WRIST: CPT | Mod: LT

## 2022-09-06 PROCEDURE — 250N000013 HC RX MED GY IP 250 OP 250 PS 637: Performed by: PHYSICIAN ASSISTANT

## 2022-09-06 PROCEDURE — 25600 CLTX DST RDL FX/EPHYS SEP WO: CPT | Mod: LT

## 2022-09-06 PROCEDURE — 73080 X-RAY EXAM OF ELBOW: CPT | Mod: LT

## 2022-09-06 PROCEDURE — 73130 X-RAY EXAM OF HAND: CPT | Mod: LT

## 2022-09-06 PROCEDURE — 99285 EMERGENCY DEPT VISIT HI MDM: CPT | Mod: 25

## 2022-09-06 RX ORDER — OXYCODONE HYDROCHLORIDE 5 MG/1
5 TABLET ORAL ONCE
Status: COMPLETED | OUTPATIENT
Start: 2022-09-06 | End: 2022-09-06

## 2022-09-06 RX ORDER — OXYCODONE HYDROCHLORIDE 5 MG/1
5 TABLET ORAL EVERY 6 HOURS PRN
Qty: 12 TABLET | Refills: 0 | Status: SHIPPED | OUTPATIENT
Start: 2022-09-06 | End: 2022-09-09

## 2022-09-06 RX ORDER — ACETAMINOPHEN 500 MG
1000 TABLET ORAL ONCE
Status: COMPLETED | OUTPATIENT
Start: 2022-09-06 | End: 2022-09-06

## 2022-09-06 RX ADMIN — ACETAMINOPHEN 500 MG: 500 TABLET, FILM COATED ORAL at 21:07

## 2022-09-06 RX ADMIN — OXYCODONE HYDROCHLORIDE 5 MG: 5 TABLET ORAL at 21:07

## 2022-09-06 ASSESSMENT — ENCOUNTER SYMPTOMS
ARTHRALGIAS: 1
BACK PAIN: 0
ABDOMINAL PAIN: 0
HEADACHES: 0

## 2022-09-06 ASSESSMENT — ACTIVITIES OF DAILY LIVING (ADL)
ADLS_ACUITY_SCORE: 33
ADLS_ACUITY_SCORE: 35

## 2022-09-07 NOTE — DISCHARGE INSTRUCTIONS

## 2022-09-07 NOTE — ED NOTES
Agree with triage- patient has mild deformity to left wrist. Able to move all fingers +radial pulse. Patient was putting away groceries and stepped in to a kitchen drawer that was open and fell over.

## 2022-09-07 NOTE — ED PROVIDER NOTES
Emergency Department Attending Supervision Note  9/6/2022  10:38 PM      I evaluated this patient in conjunction with Quintin [place] PAC      Briefly, the patient presented with fall.  Patient reported tripping over an open drawer while putting away dinner.  Denies hitting her head, denies any other trauma.        On my exam,   General:  Sitting on bed, comfortable appearing.   Head:  No obvious trauma to head  Ears, Nose:  External ears normal.  Nose normal.   Eyes:  Conjunctivae clear.  Pupils round and symmetry.    Neck:  Normal range of motion. Neck supple and symmetric.    Pulm/Chest:  No respiratory distress.  Breathing comfortably.    CV: Regular rate and rhythm.    ABD: non tender non distended  MSK:  LEFT WRIST with tenderness to palpation over the distal radius, reduced ROM of the wrist.  2+ radial pulses.  Sensation intact.    Neuro:  Alert. Moving all extremities.    Skin:  Skin is warm and dry.        Splint Placement      Procedure: Splint Placement      Indication: Fracture     Consent: Verbal      Location: Left Wrist and Forearm     Preparation: Wounds were cleansed and dressed with a non-adherent bandage      Procedure detail:   Splint was applied by Tech/Nurse with my assistance  Splint type: Sugar-tong   Splint materilal: Fiberglass  After placement I checked and adjusted the fit as needed to ensure proper positioning/fit   Sensation and circulation are intact after splint placement      Patient Status: The patient tolerated the procedure well: Yes. There were no complications.      My impression is distal radius fracture.  Vital signs hypertensive likely related to pain.  Patient also has known hypertension.  Patient had a mechanical fall.  Broad differentials pursued include not limited to fracture, contusion, neurovascular injury, open wound, etc.  X-rays were obtained showing a distal radius fracture.  No indication for reduction.  Neurovascular intact.  Skin intact.  Splint was applied.  Recommend  close follow-up with orthopedics.  This may need surgical management but at this point does not require reduction or other manipulation.  Patient felt comfortable after splint applied.  Patient will discharge and follow-up with orthopedics.        Diagnosis    ICD-10-CM    1. Closed fracture of left wrist, initial encounter  S62.102A          MD Aries Levine Jennifer L, MD  09/07/22 0037

## 2022-09-07 NOTE — ED TRIAGE NOTES
Presents to triage with c/o L wrist and arm pain after tripping and falling. Denies hitting head or LOC. CMS intact     Triage Assessment     Row Name 09/06/22 2033       Triage Assessment (Adult)    Airway WDL WDL       Respiratory WDL    Respiratory WDL WDL       Cardiac WDL    Cardiac WDL WDL

## 2022-09-07 NOTE — ED PROVIDER NOTES
History   Chief Complaint:  Wrist Pain and Arm Pain       The history is provided by the patient.      Navya Haynes is a right-handed 66 year old female with history of hypertension and DM2 who presents with left wrist and arm pain. 2 hours ago she was in the kitchen when she fell. She tried to catch herself but stepped into a drawer, grabbed the oven door, and fell to the ground. She iced it right after. She called EMS who made her a sling. Denies loss of consciousness or hitting her head. Denies chest pain, back pain, or abdominal pain.    Review of Systems   Cardiovascular: Negative for chest pain.   Gastrointestinal: Negative for abdominal pain.   Musculoskeletal: Positive for arthralgias. Negative for back pain.   Neurological: Negative for syncope and headaches.   All other systems reviewed and are negative.      Allergies:  No Known Allergies    Medications:  [place]    Past Medical History:     Hypertension  Hyperlipidemia  DM 2  Obesity  Hypothyroidism  Cholelithiasis    Past Surgical History:    Tooth extraction with forceps    Family History:    Father: hypertension, lipids  Mother: hypertension, lipids, cardiovascular, liver cancer    Social History:  The patient presents to the ED with   PCP: No primary care provider on file.     Physical Exam     Patient Vitals for the past 24 hrs:   BP Temp Temp src Pulse Resp SpO2 Weight   09/06/22 2033 (!) 185/102 99.1  F (37.3  C) Temporal 97 20 97 % 101.2 kg (223 lb)       Physical Exam  General: Well appearing, pleasant female, resting on exam bed  HEENT: No evidence of trauma.  Conjunctive are clear. Neck range of motion intact.  Nose and throat clear.  Respiratory: Good effort  Cardiovascular: Good distal perfusion  Gastrointestinal: Nondistended  Musculoskeletal: Atraumatic  Skin: Exposed skin clear.  Neurologic: Alert.  Psych:  Patient is cooperative, with normal affect.  Left wrist: Tenderness throughout her wrist, hand, and forearm.  Range of  motion is limited secondary to pain.  She has a brisk radial pulse and good sensation to her hand.    Emergency Department Course     Imaging:  XR Hand Left G/E 3 Views   Final Result   IMPRESSION:    Wrist: Comminuted mildly displaced intra-articular fracture distal radius. The fracture is mild impaction dorsally.      Hand: No additional fracture. 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      XR Wrist Left G/E 3 Views   Final Result   IMPRESSION:    Wrist: Comminuted mildly displaced intra-articular fracture distal radius. The fracture is mild impaction dorsally.      Hand: No additional fracture. 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      Elbow  XR, G/E 3 views, left   Final Result   IMPRESSION: Normal joint spaces and alignment. No fracture or joint effusion.        Report per radiology    Procedures      Emergency Department Course:         Reviewed:  I reviewed nursing notes, vitals, past medical history and Care Everywhere    Assessments:  2100 I obtained history and examined the patient as noted above.   2200 I rechecked the patient and explained findings.     Interventions:  2107 Roxicodone 5 mg Oral  2107 Tylenol 500 mg Oral    Disposition:  The patient was discharged to home.     Impression & Plan       Medical Decision Making:  Navya Haynes is a 66 year old female with a history of hypertension, presents to the ER for evaluation of a left wrist injury.  See HPI.  She is mildly hypertensive.  She has an exam as above, detailing swelling and tenderness.  Radiographs reveal a comminuted, mildly displaced intra-articular fracture of the distal radius.  There is a little bit of mild impaction dorsally.  Radiographs of her arm otherwise are unremarkable.  At this point, I staffed the case with Dr. Chris.  We applied a splint.  Please see her note for details.  CMS was intact afterwards.  She was given the interventions above.  No other injuries are noted.  A sling was also  provided.  She is to return with new or worsening symptoms.  A prescription of oxycodone was sent to the pharmacy.  Also encouraged Tylenol.  Orthopedics follow-up is indicated.  She is comfortable with plan and has no further questions.  Discharged home with .  Side effects of opioids were discussed.    Diagnosis:    ICD-10-CM    1. Closed fracture of left wrist, initial encounter  S62.102A        Discharge Medications:  New Prescriptions    OXYCODONE (ROXICODONE) 5 MG TABLET    Take 1 tablet (5 mg) by mouth every 6 hours as needed for pain       Scribe Disclosure:  I, Lisset Muir, am serving as a scribe at 8:58 PM on 9/6/2022 to document services personally performed by Anam Gomez PA-C based on my observations and the provider's statements to me.        Anam Gomez PA-C  09/06/22 1070

## 2022-09-12 ENCOUNTER — OFFICE VISIT (OUTPATIENT)
Dept: FAMILY MEDICINE | Facility: CLINIC | Age: 67
End: 2022-09-12

## 2022-09-12 VITALS
HEIGHT: 61 IN | DIASTOLIC BLOOD PRESSURE: 84 MMHG | BODY MASS INDEX: 42.41 KG/M2 | WEIGHT: 224.6 LBS | SYSTOLIC BLOOD PRESSURE: 159 MMHG | TEMPERATURE: 98 F | OXYGEN SATURATION: 98 % | HEART RATE: 77 BPM

## 2022-09-12 DIAGNOSIS — S62.102A WRIST FRACTURE, LEFT, CLOSED, INITIAL ENCOUNTER: Primary | ICD-10-CM

## 2022-09-12 DIAGNOSIS — Z01.818 PRE-OP EXAM: ICD-10-CM

## 2022-09-12 LAB
% GRANULOCYTES: 55.5 %
BUN SERPL-MCNC: 12 MG/DL (ref 7–25)
BUN/CREATININE RATIO: 14.3 (ref 6–22)
CALCIUM SERPL-MCNC: 9.6 MG/DL (ref 8.6–10.3)
CHLORIDE SERPLBLD-SCNC: 105.1 MMOL/L (ref 98–110)
CO2 SERPL-SCNC: 30.9 MMOL/L (ref 20–32)
CREAT SERPL-MCNC: 0.84 MG/DL (ref 0.6–1.3)
GLUCOSE SERPL-MCNC: 119 MG/DL (ref 60–99)
HCT VFR BLD AUTO: 44.4 % (ref 35–47)
HEMOGLOBIN: 14.9 G/DL (ref 11.7–15.7)
LYMPHOCYTES NFR BLD AUTO: 35.4 %
MCH RBC QN AUTO: 30.3 PG (ref 26–33)
MCHC RBC AUTO-ENTMCNC: 33.6 G/DL (ref 31–36)
MCV RBC AUTO: 90.2 FL (ref 78–100)
MONOCYTES NFR BLD AUTO: 9.1 %
PLATELET COUNT - QUEST: 224 10^9/L (ref 150–375)
POTASSIUM SERPL-SCNC: 4.9 MMOL/L (ref 3.5–5.3)
RBC # BLD AUTO: 4.92 10*12/L (ref 3.8–5.2)
SODIUM SERPL-SCNC: 141.2 MMOL/L (ref 135–146)
WBC # BLD AUTO: 7.7 10*9/L (ref 4–11)

## 2022-09-12 PROCEDURE — 99214 OFFICE O/P EST MOD 30 MIN: CPT | Performed by: PHYSICIAN ASSISTANT

## 2022-09-12 PROCEDURE — 80048 BASIC METABOLIC PNL TOTAL CA: CPT | Performed by: PHYSICIAN ASSISTANT

## 2022-09-12 PROCEDURE — 36415 COLL VENOUS BLD VENIPUNCTURE: CPT | Performed by: PHYSICIAN ASSISTANT

## 2022-09-12 PROCEDURE — 85025 COMPLETE CBC W/AUTO DIFF WBC: CPT | Performed by: PHYSICIAN ASSISTANT

## 2022-09-12 NOTE — LETTER
[facility]  [address] [place] [address]  [address]  [facility] [address]  Phone: [phone]  Fax: [phone]  Primary Provider: Herbert Romo  Pre-op Performing Provider: HERBERT ROMO      PREOPERATIVE EVALUATION:  Today's date: 9/12/2022    Navya Haynes is a 66 year old female who presents for a preoperative evaluation.    Surgical Information:  Surgery/Procedure: L wrist fracture repair  Surgery Location: [facility]  Surgeon: Dr. Mesa  Surgery Date: 9/13/22  Time of Surgery: 1530  Where patient plans to recover: At home with family  Fax number for surgical facility:     Type of Anesthesia Anticipated: to be determined    Assessment & Plan     The proposed surgical procedure is considered INTERMEDIATE risk.    Wrist fracture, left, closed, initial encounter-please note elevated blood pressure, patient hasn't been sleeping well and is in pain. Working to reduce this with outside provider.  Proceed with surgery at surgeon's discretion.    - HEMOGRAM PLATELET DIFF (BFP)  - Basic Metabolic Panel (BFP)    Pre-op exam    - HEMOGRAM PLATELET DIFF (BFP)  - Basic Metabolic Panel (BFP)      Risks and Recommendations:  The patient has the following additional risks and recommendations for perioperative complications:   - Morbid obesity (BMI >40)    Hold all medications before surgery    RECOMMENDATION:  APPROVAL GIVEN to proceed with proposed procedure, without further diagnostic evaluation.      Subjective     HPI related to upcoming procedure: Patient fell in kitchen, getting L wrist repair    1. No - Have you ever had a heart attack or stroke?  2. No - Have you ever had surgery on your heart or blood vessels, such as a stent, coronary (heart) bypass, or surgery on an artery in the head, neck, heart, or legs?  3. No - Do you have chest pain when you are physically active?  4. No - Do you have a history of heart failure?  5. No - Do you currently have a cold, bronchitis, or symptoms of other respiratory  (head and chest) infections?  6. No - Do you have a cough, shortness of breath, or wheezing?  7. No - Do you or anyone in your family have a history of blood clots?  8. No - Do you or anyone in your family have a serious bleeding problem, such as long-lasting bleeding after surgeries or cuts?  9. Yes - Have you ever had anemia or been told to take iron pills? In distant past  10. No - Have you had any abnormal blood loss such as black, tarry or bloody stools, or abnormal vaginal bleeding?  11. No - Have you ever had a blood transfusion?  12. Yes - Are you willing to have a blood transfusion if it is medically needed before, during, or after your surgery?  13. No - Have you or anyone in your family ever had problems with anesthesia (sedation for surgery)?  14. No - Do you have sleep apnea, excessive snoring, or daytime drowsiness?   15. No - Do you have any artifical heart valves or other implanted medical devices, such as a pacemaker, defibrillator, or continuous glucose monitor?  16. No - Do you have any artifical joints?  17. No - Are you allergic to latex?      Health Care Directive:  Patient does not have a Health Care Directive or Living Will: Discussed advance care planning with patient; however, patient declined at this time.    Preoperative Review of :   reviewed - controlled substances prescribed by other outside provider(s).      Status of Chronic Conditions:  HYPOTHYROIDISM - Patient has a longstanding history of chronic Hypothyroidism. Patient has been doing well, noting no tremor, insomnia, hair loss or changes in skin texture. Continues to take medications as directed, without adverse reactions or side effects. Last TSH   Lab Results   Component Value Date    TSH 6.25 (H) 06/23/2020   .      Review of Systems  CONSTITUTIONAL: NEGATIVE for fever, chills, change in weight  INTEGUMENTARY/SKIN: NEGATIVE for worrisome rashes, moles or lesions  EYES: NEGATIVE for vision changes or  irritation  ENT/MOUTH: NEGATIVE for ear, mouth and throat problems  RESP: NEGATIVE for significant cough or SOB  CV: NEGATIVE for chest pain, palpitations or peripheral edema  GI: NEGATIVE for nausea, abdominal pain, heartburn, or change in bowel habits  : NEGATIVE for frequency, dysuria, or hematuria  NEURO: NEGATIVE for weakness, dizziness or paresthesias  ENDOCRINE: NEGATIVE for temperature intolerance, skin/hair changes  HEME: NEGATIVE for bleeding problems  PSYCHIATRIC: NEGATIVE for changes in mood or affect    Patient Active Problem List    Diagnosis Date Noted     Morbid obesity (H) 06/23/2020     Priority: Medium     Cholelithiasis 11/08/2018     Priority: Medium     HTN (hypertension) 03/12/2015     Priority: Medium     Hyperlipidemia 03/12/2015     Priority: Medium     DM type 2 (diabetes mellitus, type 2) (H) 01/19/2015     Priority: Medium     Palpitations 03/06/2012     Priority: Medium     Hypothyroidism 07/30/2007     Priority: Medium     Problem list name updated by automated process. Provider to review       Obesity      Priority: Medium     Problem list name updated by automated process. Provider to review       Contact dermatitis and other eczema, due to unspecified cause 06/18/2004     Priority: Medium     ACP (advance care planning) 06/23/2020     Priority: Low     Health Care Home 06/23/2020     Priority: Low      Past Medical History:   Diagnosis Date     Contact dermatitis and other eczema, due to unspecified cause      Obesity, unspecified      Past Surgical History:   Procedure Laterality Date     HC TOOTH EXTRACTION W/FORCEP  age 30's     Current Outpatient Medications   Medication Sig Dispense Refill     thyroid (ARMOUR) 90 MG tablet Take 90 mg by mouth         Allergies   Allergen Reactions     No Known Allergies         Social History     Tobacco Use     Smoking status: Former Smoker     Smokeless tobacco: Never Used     Tobacco comment: quit 1977   Substance Use Topics     Alcohol  "use: Yes     Alcohol/week: 0.0 standard drinks     Types: 1 Standard drinks or equivalent per week     Comment: rare     Family History   Problem Relation Age of Onset     Hypertension Father      Lipids Father      Hypertension Mother      Lipids Mother      Cardiovascular Mother         Pacer, possible other issues/Pacemaker      Cancer Mother         liver cancer, unknown primary     Hypertension Maternal Grandmother      Hypertension Paternal Grandmother      Cancer Paternal Grandmother         Unknown primary     Diabetes Paternal Grandfather         Elderly     History   Drug Use No         Objective     BP (!) 159/84 (BP Location: Right arm, Patient Position: Sitting, Cuff Size: Adult Large)   Pulse 77   Temp 98  F (36.7  C) (Oral)   Ht 1.549 m (5' 1\")   Wt 101.9 kg (224 lb 9.6 oz)   LMP 10/01/2009   SpO2 98%   BMI 42.44 kg/m      Physical Exam    GENERAL APPEARANCE: healthy, alert and no distress     EYES: EOMI, PERRL     HENT: ear canals and TM's normal and nose and mouth without ulcers or lesions     NECK: no adenopathy, no asymmetry, masses, or scars and thyroid normal to palpation     RESP: lungs clear to auscultation - no rales, rhonchi or wheezes     CV: regular rates and rhythm, normal S1 S2, no S3 or S4 and no murmur, click or rub     ABDOMEN:  soft, nontender, no HSM or masses and bowel sounds normal     MS: extremities normal- no gross deformities noted, no evidence of inflammation in joints, FROM in all extremities.     SKIN: no suspicious lesions or rashes     NEURO: Normal strength and tone, sensory exam grossly normal, mentation intact and speech normal     PSYCH: mentation appears normal. and affect normal/bright     LYMPHATICS: No cervical adenopathy    No results for input(s): HGB, PLT, INR, NA, POTASSIUM, CR, A1C in the last 75181 hours.     Diagnostics:  Recent Results (from the past 24 hour(s))   Basic Metabolic Panel (BFP)    Collection Time: 09/12/22 12:00 AM   Result Value Ref " Range    Carbon Dioxide 30.9 20 - 32 mmol/L    Creatinine 0.84 0.60 - 1.30 mg/dL    Glucose 119 (A) 60 - 99 mg/dL    Sodium 141.2 135 - 146 mmol/L    Potassium 4.90 3.5 - 5.3 mmol/L    Chloride 105.1 98 - 110 mmol/L    Urea Nitrogen 12 7 - 25 mg/dL    Calcium 9.6 8.6 - 10.3 mg/dL    BUN/Creatinine Ratio 14.3 6 - 22   HEMOGRAM PLATELET DIFF (BFP)    Collection Time: 09/12/22  2:57 PM   Result Value Ref Range    WBC 7.7 4.0 - 11 10*9/L    RBC Count 4.92 3.8 - 5.2 10*12/L    Hemoglobin 14.9 11.7 - 15.7 g/dL    Hematocrit 44.4 35.0 - 47.0 %    MCV 90.2 78 - 100 fL    MCH 30.3 26 - 33 pg    MCHC 33.6 31 - 36 g/dL    Platelet Count 224 150 - 375 10^9/L    % Granulocytes 55.5 %    % Lymphocytes 35.4 %    % Monocytes 9.1 %      No EKG required, no history of coronary heart disease, significant arrhythmia, peripheral arterial disease or other structural heart disease.    Revised Cardiac Risk Index (RCRI):  The patient has the following serious cardiovascular risks for perioperative complications:   - No serious cardiac risks = 0 points     RCRI Interpretation: 0 points: Class I (very low risk - 0.4% complication rate)           Signed Electronically by: Herbert Romo PA-C  Copy of this evaluation report is provided to requesting physician.

## 2022-09-12 NOTE — PROGRESS NOTES
[facility]  [address] [place] [address]  [address]  [facility] [address]  Phone: [phone]  Fax: [phone]  Primary Provider: Herbert Romo  Pre-op Performing Provider: HERBERT ROMO      PREOPERATIVE EVALUATION:  Today's date: 9/12/2022    Navya Haynes is a 66 year old female who presents for a preoperative evaluation.    Surgical Information:  Surgery/Procedure: L wrist fracture repair  Surgery Location: [facility]  Surgeon: Dr. Mesa  Surgery Date: 9/13/22  Time of Surgery: 1530  Where patient plans to recover: At home with family  Fax number for surgical facility:     Type of Anesthesia Anticipated: to be determined    Assessment & Plan     The proposed surgical procedure is considered INTERMEDIATE risk.    Wrist fracture, left, closed, initial encounter-please note elevated blood pressure, patient hasn't been sleeping well and is in pain. Working to reduce this with outside provider.  Proceed with surgery at surgeon's discretion.    - HEMOGRAM PLATELET DIFF (BFP)  - Basic Metabolic Panel (BFP)    Pre-op exam    - HEMOGRAM PLATELET DIFF (BFP)  - Basic Metabolic Panel (BFP)      Risks and Recommendations:  The patient has the following additional risks and recommendations for perioperative complications:   - Morbid obesity (BMI >40)    Hold all medications before surgery    RECOMMENDATION:  APPROVAL GIVEN to proceed with proposed procedure, without further diagnostic evaluation.      Subjective     HPI related to upcoming procedure: Patient fell in kitchen, getting L wrist repair    1. No - Have you ever had a heart attack or stroke?  2. No - Have you ever had surgery on your heart or blood vessels, such as a stent, coronary (heart) bypass, or surgery on an artery in the head, neck, heart, or legs?  3. No - Do you have chest pain when you are physically active?  4. No - Do you have a history of heart failure?  5. No - Do you currently have a cold, bronchitis, or symptoms of other respiratory  (head and chest) infections?  6. No - Do you have a cough, shortness of breath, or wheezing?  7. No - Do you or anyone in your family have a history of blood clots?  8. No - Do you or anyone in your family have a serious bleeding problem, such as long-lasting bleeding after surgeries or cuts?  9. Yes - Have you ever had anemia or been told to take iron pills? In distant past  10. No - Have you had any abnormal blood loss such as black, tarry or bloody stools, or abnormal vaginal bleeding?  11. No - Have you ever had a blood transfusion?  12. Yes - Are you willing to have a blood transfusion if it is medically needed before, during, or after your surgery?  13. No - Have you or anyone in your family ever had problems with anesthesia (sedation for surgery)?  14. No - Do you have sleep apnea, excessive snoring, or daytime drowsiness?   15. No - Do you have any artifical heart valves or other implanted medical devices, such as a pacemaker, defibrillator, or continuous glucose monitor?  16. No - Do you have any artifical joints?  17. No - Are you allergic to latex?      Health Care Directive:  Patient does not have a Health Care Directive or Living Will: Discussed advance care planning with patient; however, patient declined at this time.    Preoperative Review of :   reviewed - controlled substances prescribed by other outside provider(s).      Status of Chronic Conditions:  HYPOTHYROIDISM - Patient has a longstanding history of chronic Hypothyroidism. Patient has been doing well, noting no tremor, insomnia, hair loss or changes in skin texture. Continues to take medications as directed, without adverse reactions or side effects. Last TSH   Lab Results   Component Value Date    TSH 6.25 (H) 06/23/2020   .      Review of Systems  CONSTITUTIONAL: NEGATIVE for fever, chills, change in weight  INTEGUMENTARY/SKIN: NEGATIVE for worrisome rashes, moles or lesions  EYES: NEGATIVE for vision changes or  irritation  ENT/MOUTH: NEGATIVE for ear, mouth and throat problems  RESP: NEGATIVE for significant cough or SOB  CV: NEGATIVE for chest pain, palpitations or peripheral edema  GI: NEGATIVE for nausea, abdominal pain, heartburn, or change in bowel habits  : NEGATIVE for frequency, dysuria, or hematuria  NEURO: NEGATIVE for weakness, dizziness or paresthesias  ENDOCRINE: NEGATIVE for temperature intolerance, skin/hair changes  HEME: NEGATIVE for bleeding problems  PSYCHIATRIC: NEGATIVE for changes in mood or affect    Patient Active Problem List    Diagnosis Date Noted     Morbid obesity (H) 06/23/2020     Priority: Medium     Cholelithiasis 11/08/2018     Priority: Medium     HTN (hypertension) 03/12/2015     Priority: Medium     Hyperlipidemia 03/12/2015     Priority: Medium     DM type 2 (diabetes mellitus, type 2) (H) 01/19/2015     Priority: Medium     Palpitations 03/06/2012     Priority: Medium     Hypothyroidism 07/30/2007     Priority: Medium     Problem list name updated by automated process. Provider to review       Obesity      Priority: Medium     Problem list name updated by automated process. Provider to review       Contact dermatitis and other eczema, due to unspecified cause 06/18/2004     Priority: Medium     ACP (advance care planning) 06/23/2020     Priority: Low     Health Care Home 06/23/2020     Priority: Low      Past Medical History:   Diagnosis Date     Contact dermatitis and other eczema, due to unspecified cause      Obesity, unspecified      Past Surgical History:   Procedure Laterality Date     HC TOOTH EXTRACTION W/FORCEP  age 30's     Current Outpatient Medications   Medication Sig Dispense Refill     thyroid (ARMOUR) 90 MG tablet Take 90 mg by mouth         Allergies   Allergen Reactions     No Known Allergies         Social History     Tobacco Use     Smoking status: Former Smoker     Smokeless tobacco: Never Used     Tobacco comment: quit 1977   Substance Use Topics     Alcohol  "use: Yes     Alcohol/week: 0.0 standard drinks     Types: 1 Standard drinks or equivalent per week     Comment: rare     Family History   Problem Relation Age of Onset     Hypertension Father      Lipids Father      Hypertension Mother      Lipids Mother      Cardiovascular Mother         Pacer, possible other issues/Pacemaker      Cancer Mother         liver cancer, unknown primary     Hypertension Maternal Grandmother      Hypertension Paternal Grandmother      Cancer Paternal Grandmother         Unknown primary     Diabetes Paternal Grandfather         Elderly     History   Drug Use No         Objective     BP (!) 159/84 (BP Location: Right arm, Patient Position: Sitting, Cuff Size: Adult Large)   Pulse 77   Temp 98  F (36.7  C) (Oral)   Ht 1.549 m (5' 1\")   Wt 101.9 kg (224 lb 9.6 oz)   LMP 10/01/2009   SpO2 98%   BMI 42.44 kg/m      Physical Exam    GENERAL APPEARANCE: healthy, alert and no distress     EYES: EOMI, PERRL     HENT: ear canals and TM's normal and nose and mouth without ulcers or lesions     NECK: no adenopathy, no asymmetry, masses, or scars and thyroid normal to palpation     RESP: lungs clear to auscultation - no rales, rhonchi or wheezes     CV: regular rates and rhythm, normal S1 S2, no S3 or S4 and no murmur, click or rub     ABDOMEN:  soft, nontender, no HSM or masses and bowel sounds normal     MS: extremities normal- no gross deformities noted, no evidence of inflammation in joints, FROM in all extremities.     SKIN: no suspicious lesions or rashes     NEURO: Normal strength and tone, sensory exam grossly normal, mentation intact and speech normal     PSYCH: mentation appears normal. and affect normal/bright     LYMPHATICS: No cervical adenopathy    No results for input(s): HGB, PLT, INR, NA, POTASSIUM, CR, A1C in the last 21991 hours.     Diagnostics:  Recent Results (from the past 24 hour(s))   Basic Metabolic Panel (BFP)    Collection Time: 09/12/22 12:00 AM   Result Value Ref " Range    Carbon Dioxide 30.9 20 - 32 mmol/L    Creatinine 0.84 0.60 - 1.30 mg/dL    Glucose 119 (A) 60 - 99 mg/dL    Sodium 141.2 135 - 146 mmol/L    Potassium 4.90 3.5 - 5.3 mmol/L    Chloride 105.1 98 - 110 mmol/L    Urea Nitrogen 12 7 - 25 mg/dL    Calcium 9.6 8.6 - 10.3 mg/dL    BUN/Creatinine Ratio 14.3 6 - 22   HEMOGRAM PLATELET DIFF (BFP)    Collection Time: 09/12/22  2:57 PM   Result Value Ref Range    WBC 7.7 4.0 - 11 10*9/L    RBC Count 4.92 3.8 - 5.2 10*12/L    Hemoglobin 14.9 11.7 - 15.7 g/dL    Hematocrit 44.4 35.0 - 47.0 %    MCV 90.2 78 - 100 fL    MCH 30.3 26 - 33 pg    MCHC 33.6 31 - 36 g/dL    Platelet Count 224 150 - 375 10^9/L    % Granulocytes 55.5 %    % Lymphocytes 35.4 %    % Monocytes 9.1 %      No EKG required, no history of coronary heart disease, significant arrhythmia, peripheral arterial disease or other structural heart disease.    Revised Cardiac Risk Index (RCRI):  The patient has the following serious cardiovascular risks for perioperative complications:   - No serious cardiac risks = 0 points     RCRI Interpretation: 0 points: Class I (very low risk - 0.4% complication rate)           Signed Electronically by: Herbert Romo PA-C  Copy of this evaluation report is provided to requesting physician.

## 2022-10-09 ENCOUNTER — HEALTH MAINTENANCE LETTER (OUTPATIENT)
Age: 67
End: 2022-10-09

## 2022-11-26 ENCOUNTER — HEALTH MAINTENANCE LETTER (OUTPATIENT)
Age: 67
End: 2022-11-26

## 2023-03-25 ENCOUNTER — HEALTH MAINTENANCE LETTER (OUTPATIENT)
Age: 68
End: 2023-03-25

## 2023-08-19 ENCOUNTER — HEALTH MAINTENANCE LETTER (OUTPATIENT)
Age: 68
End: 2023-08-19

## 2023-10-28 ENCOUNTER — HEALTH MAINTENANCE LETTER (OUTPATIENT)
Age: 68
End: 2023-10-28

## 2024-01-06 ENCOUNTER — HEALTH MAINTENANCE LETTER (OUTPATIENT)
Age: 69
End: 2024-01-06

## 2024-05-25 ENCOUNTER — HEALTH MAINTENANCE LETTER (OUTPATIENT)
Age: 69
End: 2024-05-25

## 2024-06-17 PROBLEM — Z76.89 HEALTH CARE HOME: Status: RESOLVED | Noted: 2020-06-23 | Resolved: 2024-06-17

## 2024-10-12 ENCOUNTER — HEALTH MAINTENANCE LETTER (OUTPATIENT)
Age: 69
End: 2024-10-12

## 2025-01-25 ENCOUNTER — HEALTH MAINTENANCE LETTER (OUTPATIENT)
Age: 70
End: 2025-01-25

## 2025-05-03 ENCOUNTER — HEALTH MAINTENANCE LETTER (OUTPATIENT)
Age: 70
End: 2025-05-03

## 2025-06-14 ENCOUNTER — HEALTH MAINTENANCE LETTER (OUTPATIENT)
Age: 70
End: 2025-06-14

## 2025-08-16 ENCOUNTER — HEALTH MAINTENANCE LETTER (OUTPATIENT)
Age: 70
End: 2025-08-16